# Patient Record
Sex: FEMALE | Race: WHITE | Employment: FULL TIME | URBAN - METROPOLITAN AREA
[De-identification: names, ages, dates, MRNs, and addresses within clinical notes are randomized per-mention and may not be internally consistent; named-entity substitution may affect disease eponyms.]

---

## 2017-05-01 ENCOUNTER — OFFICE VISIT (OUTPATIENT)
Dept: OBGYN CLINIC | Facility: CLINIC | Age: 42
End: 2017-05-01

## 2017-05-01 VITALS
SYSTOLIC BLOOD PRESSURE: 118 MMHG | HEIGHT: 64 IN | BODY MASS INDEX: 36.84 KG/M2 | WEIGHT: 215.81 LBS | HEART RATE: 108 BPM | DIASTOLIC BLOOD PRESSURE: 82 MMHG

## 2017-05-01 DIAGNOSIS — N92.6 MISSED MENSES: Primary | ICD-10-CM

## 2017-05-01 DIAGNOSIS — O09.522 AMA (ADVANCED MATERNAL AGE) MULTIGRAVIDA 35+, SECOND TRIMESTER: ICD-10-CM

## 2017-05-01 DIAGNOSIS — Z34.82 ENCOUNTER FOR SUPERVISION OF OTHER NORMAL PREGNANCY IN SECOND TRIMESTER: ICD-10-CM

## 2017-05-01 DIAGNOSIS — O09.32 LATE PRENATAL CARE COMPLICATING PREGNANCY, SECOND TRIMESTER: ICD-10-CM

## 2017-05-01 PROBLEM — O09.529 AMA (ADVANCED MATERNAL AGE) MULTIGRAVIDA 35+: Status: ACTIVE | Noted: 2017-05-01

## 2017-05-01 PROCEDURE — 99202 OFFICE O/P NEW SF 15 MIN: CPT | Performed by: ADVANCED PRACTICE MIDWIFE

## 2017-05-01 PROCEDURE — 81025 URINE PREGNANCY TEST: CPT | Performed by: ADVANCED PRACTICE MIDWIFE

## 2017-05-01 RX ORDER — CHOLECALCIFEROL (VITAMIN D3) 25 MCG
1 TABLET,CHEWABLE ORAL DAILY
COMMUNITY

## 2017-05-01 NOTE — PROGRESS NOTES
S.   LMP on or about 10/31/17.  35 y/o . Was not trying to get pregnant. Just happened. Family is happy. Works as a . Has 3 cats with  changing litter. Feeling fetal movements since beginning or April.   Has had no illn

## 2017-05-03 ENCOUNTER — LAB ENCOUNTER (OUTPATIENT)
Dept: LAB | Facility: HOSPITAL | Age: 42
End: 2017-05-03
Attending: ADVANCED PRACTICE MIDWIFE
Payer: COMMERCIAL

## 2017-05-03 DIAGNOSIS — Z34.82 ENCOUNTER FOR SUPERVISION OF OTHER NORMAL PREGNANCY IN SECOND TRIMESTER: ICD-10-CM

## 2017-05-03 LAB
ANTIBODY SCREEN OB: NEGATIVE
ANTIBODY SCREEN OB: NEGATIVE

## 2017-05-03 PROCEDURE — 36415 COLL VENOUS BLD VENIPUNCTURE: CPT

## 2017-05-03 PROCEDURE — 85025 COMPLETE CBC W/AUTO DIFF WBC: CPT

## 2017-05-03 PROCEDURE — 86778 TOXOPLASMA ANTIBODY IGM: CPT

## 2017-05-03 PROCEDURE — 87389 HIV-1 AG W/HIV-1&-2 AB AG IA: CPT

## 2017-05-03 PROCEDURE — 86777 TOXOPLASMA ANTIBODY: CPT

## 2017-05-03 PROCEDURE — 86901 BLOOD TYPING SEROLOGIC RH(D): CPT

## 2017-05-03 PROCEDURE — 87340 HEPATITIS B SURFACE AG IA: CPT

## 2017-05-03 PROCEDURE — 86762 RUBELLA ANTIBODY: CPT

## 2017-05-03 PROCEDURE — 86850 RBC ANTIBODY SCREEN: CPT

## 2017-05-03 PROCEDURE — 87086 URINE CULTURE/COLONY COUNT: CPT

## 2017-05-03 PROCEDURE — 86803 HEPATITIS C AB TEST: CPT

## 2017-05-03 PROCEDURE — 84443 ASSAY THYROID STIM HORMONE: CPT

## 2017-05-03 PROCEDURE — 86900 BLOOD TYPING SEROLOGIC ABO: CPT

## 2017-05-03 PROCEDURE — 82950 GLUCOSE TEST: CPT

## 2017-05-03 PROCEDURE — 86780 TREPONEMA PALLIDUM: CPT

## 2017-05-04 ENCOUNTER — NURSE ONLY (OUTPATIENT)
Dept: OBGYN CLINIC | Facility: CLINIC | Age: 42
End: 2017-05-04

## 2017-05-04 DIAGNOSIS — Z34.82 OTHER NORMAL PREGNANCY, NOT FIRST, SECOND TRIMESTER: Primary | ICD-10-CM

## 2017-05-04 NOTE — PROGRESS NOTES
Pt here today for [facility]   Education visit, Educational material reviewed. Pt verbalized understanding. pn labs already completed. Consents to blood transfusion. Undecided on panorama, gave pt brochure.

## 2017-05-05 ENCOUNTER — TELEPHONE (OUTPATIENT)
Dept: OBGYN CLINIC | Facility: CLINIC | Age: 42
End: 2017-05-05

## 2017-05-05 RX ORDER — NITROFURANTOIN 25; 75 MG/1; MG/1
100 CAPSULE ORAL 2 TIMES DAILY
Qty: 10 CAPSULE | Refills: 0 | Status: SHIPPED | OUTPATIENT
Start: 2017-05-05 | End: 2017-05-10

## 2017-05-05 NOTE — TELEPHONE ENCOUNTER
----- Message from Brian Martinez MD sent at 5/4/2017 11:41 AM CDT -----  + Urine Culture. Send in Macrobid 100 mg BID x 5 days. Call patient.

## 2017-05-05 NOTE — TELEPHONE ENCOUNTER
Spoke to patient, informed patient per MLM urine culture positive, macrobid was sent in to her pharmacy patient agrees with plan patient verbally understands

## 2017-05-05 NOTE — TELEPHONE ENCOUNTER
LMTCB. Need to inform pt of positive urine culture for staph. Macrobid ordered and sent to pt's pharmacy.

## 2017-05-18 ENCOUNTER — INITIAL PRENATAL (OUTPATIENT)
Dept: OBGYN CLINIC | Facility: CLINIC | Age: 42
End: 2017-05-18

## 2017-05-18 VITALS
WEIGHT: 217 LBS | SYSTOLIC BLOOD PRESSURE: 122 MMHG | BODY MASS INDEX: 37 KG/M2 | DIASTOLIC BLOOD PRESSURE: 73 MMHG | HEART RATE: 77 BPM

## 2017-05-18 DIAGNOSIS — Z34.83 OTHER NORMAL PREGNANCY, NOT FIRST, THIRD TRIMESTER: Primary | ICD-10-CM

## 2017-05-18 PROBLEM — O09.299 HX OF MACROSOMIA IN INFANT IN PRIOR PREGNANCY, CURRENTLY PREGNANT: Status: ACTIVE | Noted: 2017-05-18

## 2017-05-18 NOTE — PROGRESS NOTES
Pt with hx of term delivery with vacuum assist, 9 lb baby, no shoulder dystocia per pt. Preg counseling done. Has MFM level 2 u/s tomorrow.

## 2017-05-19 ENCOUNTER — HOSPITAL ENCOUNTER (OUTPATIENT)
Dept: PERINATAL CARE | Facility: HOSPITAL | Age: 42
Discharge: HOME | End: 2017-05-19
Attending: OBSTETRICS & GYNECOLOGY
Payer: COMMERCIAL

## 2017-05-19 ENCOUNTER — TELEPHONE (OUTPATIENT)
Dept: OBGYN CLINIC | Facility: CLINIC | Age: 42
End: 2017-05-19

## 2017-05-19 VITALS — SYSTOLIC BLOOD PRESSURE: 113 MMHG | DIASTOLIC BLOOD PRESSURE: 54 MMHG | HEART RATE: 76 BPM

## 2017-05-19 DIAGNOSIS — O09.293 HX OF MACROSOMIA IN INFANT IN PRIOR PREGNANCY, CURRENTLY PREGNANT, THIRD TRIMESTER: ICD-10-CM

## 2017-05-19 DIAGNOSIS — R73.09 ABNORMAL GTT (GLUCOSE TOLERANCE TEST): Primary | ICD-10-CM

## 2017-05-19 DIAGNOSIS — O09.523 AMA (ADVANCED MATERNAL AGE) MULTIGRAVIDA 35+, THIRD TRIMESTER: ICD-10-CM

## 2017-05-19 DIAGNOSIS — O09.523 AMA (ADVANCED MATERNAL AGE) MULTIGRAVIDA 35+, THIRD TRIMESTER: Primary | ICD-10-CM

## 2017-05-19 PROCEDURE — 76811 OB US DETAILED SNGL FETUS: CPT | Performed by: OBSTETRICS & GYNECOLOGY

## 2017-05-19 PROCEDURE — 99243 OFF/OP CNSLTJ NEW/EST LOW 30: CPT | Performed by: OBSTETRICS & GYNECOLOGY

## 2017-05-19 NOTE — PROGRESS NOTES
Saranya Rico is a 36year old female. Reason for Consult:   AMA. Late prenatal care. Has not had an ultrasound. Thinks she got pregnant in October. Denies any problems. First baby was over 9 lbs, had shoulder dystocia.   She stated her diabetes te options, and wider opportunities for further education and career advancement. Studies have generally shown good pregnancy outcomes in women of advanced reproductive age.  However, these women are at increased risk of infertility and pregnancy complicati obstetric population is 3 to 4 percent; this increases to 5 to 10 percent in women over age 36 and is as high as 28 percent in women over age 48.    The incidence of gestational diabetes in the general obstetric population is 3 percent, rising to 7 to 12 pe whether the association between obesity and adverse pregnancy outcome is due to factors such as diabetes mellitus.    Data suggest that obese women should be encouraged to undertake a weight reduction program (diet, exercise, behavior modification, and poss prevention or management of medical and obstetrical complications. Both prepregnancy obesity and excessive maternal weight gain before or during pregnancy contribute to an increased probability of  delivery.   It has also been hypothes pregnancy   The fetal measurements are consistent with established EDC within 2 weeks giving US Dixiese 39 7-24-17. No gross ultrasound evidence of structural abnormalities are seen today.  The patient understands that ultrasound cannot rule out all structural a allowing me to participate in the care of your patient. Please do not hesitate to call with any questions or concerns. Total patient time was 45 minutes in evaluation, consultation, and coordination of care.   Greater than 50% of this time was spent in

## 2017-05-19 NOTE — TELEPHONE ENCOUNTER
Notes Recorded by Junior Jason RN on 5/5/2017 at 9:39 AM  [phone] (home)   TCB. Phone encounter created  Notes Recorded by Kira Bowie MD on 5/4/2017 at 11:41 AM  + Urine Culture.  Send in Macrobid 100 mg BID x 5 days.  Call patient.   Debara Bosworth

## 2017-05-24 ENCOUNTER — LAB ENCOUNTER (OUTPATIENT)
Dept: LAB | Facility: HOSPITAL | Age: 42
End: 2017-05-24
Attending: ADVANCED PRACTICE MIDWIFE
Payer: COMMERCIAL

## 2017-05-24 DIAGNOSIS — R73.09 ABNORMAL GTT (GLUCOSE TOLERANCE TEST): ICD-10-CM

## 2017-05-24 PROCEDURE — 82951 GLUCOSE TOLERANCE TEST (GTT): CPT

## 2017-05-24 PROCEDURE — 36415 COLL VENOUS BLD VENIPUNCTURE: CPT

## 2017-05-24 PROCEDURE — 82952 GTT-ADDED SAMPLES: CPT

## 2017-05-31 ENCOUNTER — HOSPITAL ENCOUNTER (OUTPATIENT)
Dept: ENDOCRINOLOGY | Facility: HOSPITAL | Age: 42
Discharge: HOME | End: 2017-05-31
Attending: ADVANCED PRACTICE MIDWIFE
Payer: COMMERCIAL

## 2017-05-31 VITALS — HEIGHT: 64 IN | WEIGHT: 220.69 LBS | BODY MASS INDEX: 37.68 KG/M2

## 2017-05-31 DIAGNOSIS — O24.410 DIET CONTROLLED GESTATIONAL DIABETES MELLITUS (GDM) IN THIRD TRIMESTER: Primary | ICD-10-CM

## 2017-05-31 PROBLEM — O24.419 GESTATIONAL DIABETES MELLITUS (GDM) IN THIRD TRIMESTER: Status: ACTIVE | Noted: 2017-05-31

## 2017-06-01 ENCOUNTER — ROUTINE PRENATAL (OUTPATIENT)
Dept: OBGYN CLINIC | Facility: CLINIC | Age: 42
End: 2017-06-01

## 2017-06-01 VITALS
SYSTOLIC BLOOD PRESSURE: 113 MMHG | BODY MASS INDEX: 38 KG/M2 | DIASTOLIC BLOOD PRESSURE: 74 MMHG | HEART RATE: 79 BPM | WEIGHT: 219 LBS

## 2017-06-01 DIAGNOSIS — Z34.83 OTHER NORMAL PREGNANCY, NOT FIRST, THIRD TRIMESTER: Primary | ICD-10-CM

## 2017-06-01 NOTE — PROGRESS NOTES
Misti Santamaria  : 3/9/1975 was seen for Gestational Diabetes Counseling: Individual/Group    Date: 2017   Start time:  End time:     Obtained usual diet history    Education:     GDM Overview:  Reviewed gestational diabetes as diagnosis incl understanding and has no further questions at this time.     Carlos Brown, RN

## 2017-06-12 ENCOUNTER — HOSPITAL ENCOUNTER (OUTPATIENT)
Dept: ENDOCRINOLOGY | Facility: HOSPITAL | Age: 42
Discharge: HOME | End: 2017-06-12
Attending: ADVANCED PRACTICE MIDWIFE
Payer: COMMERCIAL

## 2017-06-12 DIAGNOSIS — O24.410 DIET CONTROLLED GESTATIONAL DIABETES MELLITUS (GDM) IN THIRD TRIMESTER: Primary | ICD-10-CM

## 2017-06-12 NOTE — PROGRESS NOTES
Sindhu Hammonds  : 3/9/1975 was seen for GDM Follow-Up Counseling    Date: 2017   Start time: 0800  End time: 0840    Weight: 219.3#    Blood Glucose:     FB-100. Patient wants to discuss values with you at upcoming appointment.      PP: B: 73-11 questions at this time.     Verl Flies

## 2017-06-15 ENCOUNTER — ROUTINE PRENATAL (OUTPATIENT)
Dept: OBGYN CLINIC | Facility: CLINIC | Age: 42
End: 2017-06-15

## 2017-06-15 ENCOUNTER — TELEPHONE (OUTPATIENT)
Dept: OBGYN CLINIC | Facility: CLINIC | Age: 42
End: 2017-06-15

## 2017-06-15 VITALS
BODY MASS INDEX: 38 KG/M2 | SYSTOLIC BLOOD PRESSURE: 127 MMHG | DIASTOLIC BLOOD PRESSURE: 79 MMHG | WEIGHT: 219.38 LBS | HEART RATE: 109 BPM

## 2017-06-15 DIAGNOSIS — Z34.83 OTHER NORMAL PREGNANCY, NOT FIRST, THIRD TRIMESTER: Primary | ICD-10-CM

## 2017-06-15 NOTE — PROGRESS NOTES
Good fetal movements. No complaints. Blood sugars show consistently elevated fasting levels ranging from . Recommend starting NPH insulin at bedtime 3 units. Will send to the diabetes center for instruction to start as soon as possible.   To notif

## 2017-06-16 ENCOUNTER — APPOINTMENT (OUTPATIENT)
Dept: OBGYN CLINIC | Facility: HOSPITAL | Age: 42
End: 2017-06-16
Attending: OBSTETRICS & GYNECOLOGY
Payer: COMMERCIAL

## 2017-06-16 ENCOUNTER — HOSPITAL ENCOUNTER (OUTPATIENT)
Facility: HOSPITAL | Age: 42
Discharge: HOME | End: 2017-06-16
Attending: OBSTETRICS & GYNECOLOGY | Admitting: OBSTETRICS & GYNECOLOGY
Payer: COMMERCIAL

## 2017-06-16 VITALS
DIASTOLIC BLOOD PRESSURE: 72 MMHG | RESPIRATION RATE: 16 BRPM | HEART RATE: 93 BPM | SYSTOLIC BLOOD PRESSURE: 133 MMHG | TEMPERATURE: 98 F

## 2017-06-16 PROCEDURE — 59025 FETAL NON-STRESS TEST: CPT | Performed by: OBSTETRICS & GYNECOLOGY

## 2017-06-16 NOTE — NST
Nonstress Test   Patient: Kelley Pyle    Gestation: 32w4d    NST: A2GDM, AMA       Variability: Moderate           Accelerations: Yes           Decelerations: None            Baseline: 125 BPM           Uterine Irritability: Yes           Contractions: I

## 2017-06-17 ENCOUNTER — HOSPITAL ENCOUNTER (OUTPATIENT)
Dept: ENDOCRINOLOGY | Facility: HOSPITAL | Age: 42
Discharge: HOME | End: 2017-06-17
Attending: ADVANCED PRACTICE MIDWIFE
Payer: COMMERCIAL

## 2017-06-17 DIAGNOSIS — O24.410 DIET CONTROLLED GESTATIONAL DIABETES MELLITUS (GDM) IN THIRD TRIMESTER: Primary | ICD-10-CM

## 2017-06-17 NOTE — PROGRESS NOTES
Jerilyn Graves  : 3/9/1975 was seen for Injection Instruction:    Date: 2017 Start time: 1230  End time: 1300      Medication type, dose and frequency: Order to start NPH insulin 3 units at bedtime.      Contacted patients preferred pharmacy to [facility]

## 2017-06-20 ENCOUNTER — HOSPITAL ENCOUNTER (OUTPATIENT)
Dept: PERINATAL CARE | Facility: HOSPITAL | Age: 42
Discharge: HOME | End: 2017-06-20
Attending: OBSTETRICS & GYNECOLOGY
Payer: COMMERCIAL

## 2017-06-20 VITALS — HEART RATE: 72 BPM | SYSTOLIC BLOOD PRESSURE: 114 MMHG | DIASTOLIC BLOOD PRESSURE: 66 MMHG

## 2017-06-20 DIAGNOSIS — O09.293 HX OF MACROSOMIA IN INFANT IN PRIOR PREGNANCY, CURRENTLY PREGNANT, THIRD TRIMESTER: ICD-10-CM

## 2017-06-20 DIAGNOSIS — O24.410 DIET CONTROLLED GESTATIONAL DIABETES MELLITUS (GDM) IN THIRD TRIMESTER: ICD-10-CM

## 2017-06-20 DIAGNOSIS — O24.414 INSULIN CONTROLLED GESTATIONAL DIABETES MELLITUS (GDM) IN THIRD TRIMESTER: ICD-10-CM

## 2017-06-20 DIAGNOSIS — O09.523 AMA (ADVANCED MATERNAL AGE) MULTIGRAVIDA 35+, THIRD TRIMESTER: ICD-10-CM

## 2017-06-20 DIAGNOSIS — O09.523 AMA (ADVANCED MATERNAL AGE) MULTIGRAVIDA 35+, THIRD TRIMESTER: Primary | ICD-10-CM

## 2017-06-20 DIAGNOSIS — O09.32 LATE PRENATAL CARE IN SECOND TRIMESTER: ICD-10-CM

## 2017-06-20 PROCEDURE — 76805 OB US >/= 14 WKS SNGL FETUS: CPT | Performed by: OBSTETRICS & GYNECOLOGY

## 2017-06-20 PROCEDURE — 99215 OFFICE O/P EST HI 40 MIN: CPT | Performed by: OBSTETRICS & GYNECOLOGY

## 2017-06-20 PROCEDURE — 76819 FETAL BIOPHYS PROFIL W/O NST: CPT | Performed by: OBSTETRICS & GYNECOLOGY

## 2017-06-20 NOTE — PROGRESS NOTES
MFM follow up outpatient consultation -  AMA; polyhydramnios, Gestational diabetes A2    S: Good FM. Doing well on the GDM diet. No complaints    Her history was reviewed form her visit on 5/19/17 and there were no interval changes.     O:  /66 mmHg is related to polycystic ovary syndrome (PCOS). It is also important to note that even among ovulatory women, increasing obesity is associated with decreasing spontaneous pregnancy rates.   The increased risk of miscarriage in obese women may be because suc emergency delivery, prolonged incision to delivery interval, blood loss >1000 mL, longer operative times, wound infection, thromboembolism, and endometritis.             Maternal obesity appears to be associated with a small increase in the absolute rate of DIABETES - follow-up  ADA diet recommendations were reviewed and the patient's dietary compliance is good.   Her capillary blood glucose records were reviewed today; her compliance with the recommended four times daily assessments (fasting and two-hour post 1.   IUP @  33w1d    2. Scan consistent with dates; BPP is 8/8  3. No fetal structural abnormalities seen but limited   4. AMA -  She declined invasive genetic testing  5. Obesity BMI 37  6.  GDM A2 - mildly elevated FBS  7. polyhydramnios      RECOMMENDATI

## 2017-06-20 NOTE — PROGRESS NOTES
Pt for growth u/s  Recently started NPH insulin a HS 3 units 6/19/17  Hx AMA and high BMI  Denies pregnancy complaints  States active fetus  Pt will email BS log Mondays

## 2017-06-21 ENCOUNTER — HOSPITAL ENCOUNTER (OUTPATIENT)
Facility: HOSPITAL | Age: 42
Discharge: HOME | End: 2017-06-21
Attending: OBSTETRICS & GYNECOLOGY | Admitting: OBSTETRICS & GYNECOLOGY
Payer: COMMERCIAL

## 2017-06-21 ENCOUNTER — TELEPHONE (OUTPATIENT)
Dept: PERINATAL CARE | Facility: HOSPITAL | Age: 42
End: 2017-06-21

## 2017-06-21 ENCOUNTER — APPOINTMENT (OUTPATIENT)
Dept: OBGYN CLINIC | Facility: HOSPITAL | Age: 42
End: 2017-06-21
Payer: COMMERCIAL

## 2017-06-21 VITALS — RESPIRATION RATE: 16 BRPM | SYSTOLIC BLOOD PRESSURE: 108 MMHG | DIASTOLIC BLOOD PRESSURE: 45 MMHG | HEART RATE: 74 BPM

## 2017-06-21 PROCEDURE — 59025 FETAL NON-STRESS TEST: CPT | Performed by: OBSTETRICS & GYNECOLOGY

## 2017-06-21 NOTE — ADDENDUM NOTE
Encounter addended by: Darrell Sunshine on: 6/21/2017 12:31 PM<BR>     Documentation filed: Charges VN

## 2017-06-21 NOTE — NST
Nonstress Test   Patient: Mohit Parrish    Gestation: 33w2d    NST: SCHEDULED NST FOR A2GDM AND AMA       Variability: Moderate           Accelerations: Yes           Decelerations: None            Baseline: 145 BPM           Uterine Irritability: Yes

## 2017-06-23 ENCOUNTER — TELEPHONE (OUTPATIENT)
Dept: OBGYN CLINIC | Facility: CLINIC | Age: 42
End: 2017-06-23

## 2017-06-23 NOTE — TELEPHONE ENCOUNTER
Please inform blood sugars I reviewed from yesterday look real good. Continue her current insulin dosing and glucose checking. Send her values in to [facility] Dr Hernandez Mandujano weekly.

## 2017-06-26 ENCOUNTER — TELEPHONE (OUTPATIENT)
Dept: PERINATAL CARE | Facility: HOSPITAL | Age: 42
End: 2017-06-26

## 2017-06-26 NOTE — TELEPHONE ENCOUNTER
Blood glucose log from 6/20/17 through 6/22/17 reviewed by Dr. Luke Flash  No change with current regime.   lvm for pt to continue current 6uNPH at hs

## 2017-06-27 ENCOUNTER — TELEPHONE (OUTPATIENT)
Dept: PERINATAL CARE | Facility: HOSPITAL | Age: 42
End: 2017-06-27

## 2017-06-28 ENCOUNTER — HOSPITAL ENCOUNTER (OUTPATIENT)
Dept: OBGYN CLINIC | Facility: HOSPITAL | Age: 42
Discharge: HOME | End: 2017-06-28
Payer: COMMERCIAL

## 2017-06-28 ENCOUNTER — HOSPITAL ENCOUNTER (OUTPATIENT)
Facility: HOSPITAL | Age: 42
Discharge: HOME | End: 2017-06-28
Attending: OBSTETRICS & GYNECOLOGY | Admitting: OBSTETRICS & GYNECOLOGY
Payer: COMMERCIAL

## 2017-06-28 VITALS
SYSTOLIC BLOOD PRESSURE: 116 MMHG | DIASTOLIC BLOOD PRESSURE: 58 MMHG | RESPIRATION RATE: 17 BRPM | WEIGHT: 220 LBS | TEMPERATURE: 98 F | BODY MASS INDEX: 37.56 KG/M2 | HEIGHT: 64 IN | HEART RATE: 69 BPM

## 2017-06-28 PROBLEM — O24.419 WHITE CLASSIFICATION A2 GESTATIONAL DIABETES MELLITUS: Status: ACTIVE | Noted: 2017-05-31

## 2017-06-28 PROCEDURE — 59025 FETAL NON-STRESS TEST: CPT | Performed by: OBSTETRICS & GYNECOLOGY

## 2017-06-28 NOTE — NST
Nonstress Test   Patient: Ed Lawrence    Gestation: 34w2d    NST:       Variability: Moderate           Accelerations: Yes           Decelerations: None            Baseline: 125 BPM           Uterine Irritability: No           Contractions: Irregular

## 2017-06-29 ENCOUNTER — ROUTINE PRENATAL (OUTPATIENT)
Dept: OBGYN CLINIC | Facility: CLINIC | Age: 42
End: 2017-06-29

## 2017-06-29 VITALS
SYSTOLIC BLOOD PRESSURE: 107 MMHG | BODY MASS INDEX: 39 KG/M2 | HEART RATE: 87 BPM | DIASTOLIC BLOOD PRESSURE: 71 MMHG | WEIGHT: 225 LBS

## 2017-06-29 DIAGNOSIS — O09.529 SUPERVISION OF ELDERLY MULTIGRAVIDA, UNSPECIFIED TRIMESTER: Primary | ICD-10-CM

## 2017-06-29 DIAGNOSIS — O24.414 INSULIN CONTROLLED GESTATIONAL DIABETES MELLITUS (GDM) DURING PREGNANCY, ANTEPARTUM: ICD-10-CM

## 2017-06-29 PROCEDURE — 81002 URINALYSIS NONAUTO W/O SCOPE: CPT | Performed by: OBSTETRICS & GYNECOLOGY

## 2017-06-29 RX ORDER — CALCIUM CARB/VITAMIN D3/VIT K1 500-100-40
TABLET,CHEWABLE ORAL
Refills: 2 | COMMUNITY
Start: 2017-06-17

## 2017-06-29 NOTE — PROGRESS NOTES
[facility], [facility]  Obstetrics and Gynecology  Prenatal Visit  Yossi Kovacs MD    HPI   Niki Raines is a 36year old.o.  34w3d weeks. Here for routine prenatal visit and is without complaints.   Patient denies any regular uterine contractions, spo 10/31/2016 (Within Weeks)   BMI 38.62 kg/m²     Assessment   Brayan is a 36year old female  with viable IUP at 34w3d weeks.   Advanced maternal age pregnancy with gestational diabetes A2  Patient has been having normal  testing and normal lucas

## 2017-07-05 ENCOUNTER — HOSPITAL ENCOUNTER (OUTPATIENT)
Facility: HOSPITAL | Age: 42
Discharge: HOME | End: 2017-07-05
Attending: OBSTETRICS & GYNECOLOGY | Admitting: OBSTETRICS & GYNECOLOGY
Payer: COMMERCIAL

## 2017-07-05 PROCEDURE — 59025 FETAL NON-STRESS TEST: CPT | Performed by: OBSTETRICS & GYNECOLOGY

## 2017-07-05 NOTE — NST
Nonstress Test   Patient: Brayan Gunn    Gestation: 35w2d    NST:  A2gdm, ama high bmi     Variability: Moderate           Accelerations: Yes           Decelerations: None            Baseline: 135 BPM           Uterine Irritability: No           Contract

## 2017-07-06 ENCOUNTER — ROUTINE PRENATAL (OUTPATIENT)
Dept: OBGYN CLINIC | Facility: CLINIC | Age: 42
End: 2017-07-06

## 2017-07-06 VITALS
WEIGHT: 222.81 LBS | DIASTOLIC BLOOD PRESSURE: 74 MMHG | SYSTOLIC BLOOD PRESSURE: 110 MMHG | HEART RATE: 96 BPM | BODY MASS INDEX: 38 KG/M2

## 2017-07-06 DIAGNOSIS — Z23 NEED FOR VACCINATION: ICD-10-CM

## 2017-07-06 DIAGNOSIS — Z34.83 OTHER NORMAL PREGNANCY, NOT FIRST, THIRD TRIMESTER: Primary | ICD-10-CM

## 2017-07-06 PROBLEM — O40.9XX0 POLYHYDRAMNIOS, ANTEPARTUM COMPLICATION: Status: ACTIVE | Noted: 2017-07-06

## 2017-07-06 LAB
APPEARANCE: CLEAR
MULTISTIX LOT#: NORMAL NUMERIC
PH, URINE: 6.5 (ref 4.5–8)
SPECIFIC GRAVITY: 1.02 (ref 1–1.03)
URINE-COLOR: YELLOW
UROBILINOGEN,SEMI-QN: 0.2 MG/DL (ref 0–1.9)

## 2017-07-06 PROCEDURE — 90471 IMMUNIZATION ADMIN: CPT | Performed by: ADVANCED PRACTICE MIDWIFE

## 2017-07-06 PROCEDURE — 90715 TDAP VACCINE 7 YRS/> IM: CPT | Performed by: ADVANCED PRACTICE MIDWIFE

## 2017-07-06 NOTE — PROGRESS NOTES
S.  Denies JUNE, vision change, URQ pain. Has some mild swelling in right leg that decreases with elevation. Testing blood sugar. Is taking insulin at night so is having readings 90 or below in am.  2 hour PP are normal under 120. Baby is active.   Denies

## 2017-07-12 ENCOUNTER — HOSPITAL ENCOUNTER (OUTPATIENT)
Facility: HOSPITAL | Age: 42
Discharge: HOME | End: 2017-07-12
Attending: OBSTETRICS & GYNECOLOGY | Admitting: OBSTETRICS & GYNECOLOGY
Payer: COMMERCIAL

## 2017-07-12 ENCOUNTER — HOSPITAL ENCOUNTER (OUTPATIENT)
Dept: PERINATAL CARE | Facility: HOSPITAL | Age: 42
Discharge: HOME | End: 2017-07-12
Attending: OBSTETRICS & GYNECOLOGY
Payer: COMMERCIAL

## 2017-07-12 ENCOUNTER — APPOINTMENT (OUTPATIENT)
Dept: OBGYN CLINIC | Facility: HOSPITAL | Age: 42
End: 2017-07-12
Payer: COMMERCIAL

## 2017-07-12 VITALS — DIASTOLIC BLOOD PRESSURE: 65 MMHG | HEART RATE: 73 BPM | RESPIRATION RATE: 18 BRPM | SYSTOLIC BLOOD PRESSURE: 127 MMHG

## 2017-07-12 VITALS — DIASTOLIC BLOOD PRESSURE: 64 MMHG | SYSTOLIC BLOOD PRESSURE: 102 MMHG | HEART RATE: 75 BPM

## 2017-07-12 DIAGNOSIS — O40.9XX0 POLYHYDRAMNIOS, ANTEPARTUM COMPLICATION: ICD-10-CM

## 2017-07-12 DIAGNOSIS — O09.32 LATE PRENATAL CARE IN SECOND TRIMESTER: ICD-10-CM

## 2017-07-12 DIAGNOSIS — O40.3XX0 POLYHYDRAMNIOS, ANTEPARTUM COMPLICATION, THIRD TRIMESTER, NOT APPLICABLE OR UNSPECIFIED FETUS: ICD-10-CM

## 2017-07-12 DIAGNOSIS — O09.523 AMA (ADVANCED MATERNAL AGE) MULTIGRAVIDA 35+, THIRD TRIMESTER: ICD-10-CM

## 2017-07-12 DIAGNOSIS — O24.419 WHITE CLASSIFICATION A2 GESTATIONAL DIABETES MELLITUS: ICD-10-CM

## 2017-07-12 DIAGNOSIS — O09.523 AMA (ADVANCED MATERNAL AGE) MULTIGRAVIDA 35+, THIRD TRIMESTER: Primary | ICD-10-CM

## 2017-07-12 PROCEDURE — 76819 FETAL BIOPHYS PROFIL W/O NST: CPT | Performed by: OBSTETRICS & GYNECOLOGY

## 2017-07-12 PROCEDURE — 76805 OB US >/= 14 WKS SNGL FETUS: CPT | Performed by: OBSTETRICS & GYNECOLOGY

## 2017-07-12 PROCEDURE — 59025 FETAL NON-STRESS TEST: CPT | Performed by: OBSTETRICS & GYNECOLOGY

## 2017-07-12 PROCEDURE — 99214 OFFICE O/P EST MOD 30 MIN: CPT | Performed by: OBSTETRICS & GYNECOLOGY

## 2017-07-12 NOTE — PROGRESS NOTES
MFM follow up outpatient consultation -  AMA; polyhydramnios, Gestational diabetes A2     S: Good FM. Doing well on the GDM diet.   No complaints.     Her history was reviewed form her visit on 5/19/17 and there were no interval changes.     O:  /64 obese  is associated with numerous perioperative concerns, including emergency delivery, prolonged incision to delivery interval, blood loss >1000 mL, longer operative times, wound infection, thromboembolism, and endometritis.             Maternal ob Posterior, grade 3      Fetal Anatomy:  Visualized with normal appearance: head, face, spine, brain, chest,  gastrointestinal tract, kidneys, bladder.     Summary of Ultrasound findings: This is a [place] pregnancy; cephalic.   EFW 8 lbs 13 oz (4009 gram allowing me to participate in the care of your patient.  Please do not hesitate to call with any questions or concerns.      Total patient time was 25 minutes in evaluation, consultation, and coordination of care.  Greater than 50% of this time was spent i

## 2017-07-12 NOTE — PROGRESS NOTES
Pt for growth U/S  Hx AMA and a2gdm   6 units nph at hs since 6/20/17  Denies pregnancy complaints  States active fetus

## 2017-07-12 NOTE — NST
Nonstress Test   Patient: Leslie Willard    Gestation: 36w2d    NST: SCHEDULED NST FOR AMA AND A2GDM       Variability: Moderate           Accelerations: Yes           Decelerations: Variable            Baseline: 125 BPM           Uterine Irritability: Yes

## 2017-07-12 NOTE — ADDENDUM NOTE
Encounter addended by: Juan Paulson on: 7/12/2017  5:31 PM<BR>    Actions taken: Charge Capture section accepted

## 2017-07-17 ENCOUNTER — HOSPITAL ENCOUNTER (OUTPATIENT)
Facility: HOSPITAL | Age: 42
Discharge: HOME | End: 2017-07-17
Attending: OBSTETRICS & GYNECOLOGY | Admitting: OBSTETRICS & GYNECOLOGY
Payer: COMMERCIAL

## 2017-07-17 ENCOUNTER — HOSPITAL ENCOUNTER (OUTPATIENT)
Dept: OBGYN CLINIC | Facility: HOSPITAL | Age: 42
Discharge: HOME | End: 2017-07-17
Payer: COMMERCIAL

## 2017-07-17 ENCOUNTER — ROUTINE PRENATAL (OUTPATIENT)
Dept: OBGYN CLINIC | Facility: CLINIC | Age: 42
End: 2017-07-17

## 2017-07-17 VITALS
BODY MASS INDEX: 38 KG/M2 | HEART RATE: 93 BPM | WEIGHT: 224.19 LBS | SYSTOLIC BLOOD PRESSURE: 119 MMHG | DIASTOLIC BLOOD PRESSURE: 76 MMHG

## 2017-07-17 DIAGNOSIS — Z34.83 ENCOUNTER FOR SUPERVISION OF OTHER NORMAL PREGNANCY IN THIRD TRIMESTER: Primary | ICD-10-CM

## 2017-07-17 LAB
APPEARANCE: CLEAR
MULTISTIX LOT#: NORMAL NUMERIC
PH, URINE: 6 (ref 4.5–8)
SPECIFIC GRAVITY: 1.01 (ref 1–1.03)
URINE-COLOR: YELLOW
UROBILINOGEN,SEMI-QN: 0.2 MG/DL (ref 0–1.9)

## 2017-07-17 PROCEDURE — 59025 FETAL NON-STRESS TEST: CPT | Performed by: OBSTETRICS & GYNECOLOGY

## 2017-07-18 NOTE — NST
Nonstress Test   Patient: Franklin Burk    Gestation: 37w0d    NST:       Variability: Moderate           Accelerations: Yes           Decelerations: None            Baseline: 120 BPM           Uterine Irritability: Yes           Contractions: Irregular

## 2017-07-19 ENCOUNTER — APPOINTMENT (OUTPATIENT)
Dept: OBGYN CLINIC | Facility: HOSPITAL | Age: 42
End: 2017-07-19
Payer: COMMERCIAL

## 2017-07-19 ENCOUNTER — HOSPITAL ENCOUNTER (OUTPATIENT)
Facility: HOSPITAL | Age: 42
Discharge: HOME | End: 2017-07-19
Attending: OBSTETRICS & GYNECOLOGY | Admitting: OBSTETRICS & GYNECOLOGY
Payer: COMMERCIAL

## 2017-07-19 VITALS — RESPIRATION RATE: 18 BRPM | DIASTOLIC BLOOD PRESSURE: 76 MMHG | HEART RATE: 81 BPM | SYSTOLIC BLOOD PRESSURE: 122 MMHG

## 2017-07-19 PROCEDURE — 59025 FETAL NON-STRESS TEST: CPT | Performed by: OBSTETRICS & GYNECOLOGY

## 2017-07-19 NOTE — NST
Nonstress Test   Patient: Uvaldo Acosta    Gestation: 37w2d    NST:high BMI, AMA, A2GDM       Variability: Moderate           Accelerations: Yes           Decelerations: None            Baseline: 135 BPM           Uterine Irritability: No           Contrac

## 2017-07-20 ENCOUNTER — HOSPITAL ENCOUNTER (OUTPATIENT)
Dept: PERINATAL CARE | Facility: HOSPITAL | Age: 42
Discharge: HOME | End: 2017-07-20
Attending: OBSTETRICS & GYNECOLOGY
Payer: COMMERCIAL

## 2017-07-20 ENCOUNTER — TELEPHONE (OUTPATIENT)
Dept: OBGYN CLINIC | Facility: CLINIC | Age: 42
End: 2017-07-20

## 2017-07-20 VITALS — DIASTOLIC BLOOD PRESSURE: 77 MMHG | SYSTOLIC BLOOD PRESSURE: 104 MMHG | HEART RATE: 101 BPM

## 2017-07-20 DIAGNOSIS — O09.523 AMA (ADVANCED MATERNAL AGE) MULTIGRAVIDA 35+, THIRD TRIMESTER: Primary | ICD-10-CM

## 2017-07-20 DIAGNOSIS — O09.523 AMA (ADVANCED MATERNAL AGE) MULTIGRAVIDA 35+, THIRD TRIMESTER: ICD-10-CM

## 2017-07-20 DIAGNOSIS — O09.32 LATE PRENATAL CARE IN SECOND TRIMESTER: ICD-10-CM

## 2017-07-20 PROCEDURE — 76819 FETAL BIOPHYS PROFIL W/O NST: CPT | Performed by: OBSTETRICS & GYNECOLOGY

## 2017-07-20 PROCEDURE — 99212 OFFICE O/P EST SF 10 MIN: CPT | Performed by: OBSTETRICS & GYNECOLOGY

## 2017-07-20 NOTE — PROGRESS NOTES
Pt here for level two ultrasound and BPP  AMA  Late onset prenatal Care High   BMI 37.64  A2GDM   Denies pregnancy complaints and states feeling fetal movement

## 2017-07-20 NOTE — PROGRESS NOTES
MFM follow up outpatient consultation -  AMA; polyhydramnios, Gestational diabetes A2     S: Good FM. Doing well on the GDM diet. No complaints.   Blood sugars have been good since starting insulin.     Her history was reviewed form her visit on 5/19/17 a

## 2017-07-20 NOTE — TELEPHONE ENCOUNTER
Please inform patient and also schedule induction of labor. On the advice of Maternal-Fetal Medicine Dr. Magdalena Castro Recommend delivery at 38 weeks due to  Class A2 gestational diabetes, advanced maternal age, and polyhydramnios.   Schedule patient to be admi

## 2017-07-21 NOTE — TELEPHONE ENCOUNTER
Spoke to pt and informed her of IOL. Informed her to get there 1 hr early. She Understood and verbalized agreement. Entered in book.

## 2017-07-23 ENCOUNTER — HOSPITAL ENCOUNTER (INPATIENT)
Dept: OBGYN CLINIC | Facility: HOSPITAL | Age: 42
Discharge: HOME | End: 2017-07-23
Attending: OBSTETRICS & GYNECOLOGY
Payer: COMMERCIAL

## 2017-07-23 ENCOUNTER — HOSPITAL ENCOUNTER (INPATIENT)
Facility: HOSPITAL | Age: 42
LOS: 4 days | Discharge: HOME | End: 2017-07-27
Attending: OBSTETRICS & GYNECOLOGY | Admitting: OBSTETRICS & GYNECOLOGY
Payer: COMMERCIAL

## 2017-07-23 PROBLEM — Z34.90 PREGNANCY: Status: ACTIVE | Noted: 2017-07-23

## 2017-07-23 LAB
ANTIBODY SCREEN: NEGATIVE
ERYTHROCYTE [DISTWIDTH] IN BLOOD BY AUTOMATED COUNT: 14.9 % (ref 11–15)
GLUCOSE BLDC GLUCOMTR-MCNC: 120 MG/DL (ref 70–99)
HCT VFR BLD AUTO: 34.3 % (ref 35–48)
HGB BLD-MCNC: 11.4 G/DL (ref 12–16)
MCH RBC QN AUTO: 27 PG (ref 27–32)
MCHC RBC AUTO-ENTMCNC: 33.3 G/DL (ref 32–37)
MCV RBC AUTO: 81.1 FL (ref 80–100)
PLATELET # BLD AUTO: 243 K/UL (ref 140–400)
PMV BLD AUTO: 9.6 FL (ref 7.4–10.3)
RBC # BLD AUTO: 4.22 M/UL (ref 3.7–5.4)
RH BLOOD TYPE: POSITIVE
WBC # BLD AUTO: 9.9 K/UL (ref 4–11)

## 2017-07-23 PROCEDURE — 3E0P7GC INTRODUCTION OF OTHER THERAPEUTIC SUBSTANCE INTO FEMALE REPRODUCTIVE, VIA NATURAL OR ARTIFICIAL OPENING: ICD-10-PCS | Performed by: OBSTETRICS & GYNECOLOGY

## 2017-07-23 PROCEDURE — 59200 INSERT CERVICAL DILATOR: CPT | Performed by: OBSTETRICS & GYNECOLOGY

## 2017-07-23 RX ORDER — AMMONIA INHALANTS 0.04 G/.3ML
0.3 INHALANT RESPIRATORY (INHALATION) AS NEEDED
Status: DISCONTINUED | OUTPATIENT
Start: 2017-07-23 | End: 2017-07-24

## 2017-07-23 RX ORDER — TERBUTALINE SULFATE 1 MG/ML
0.25 INJECTION, SOLUTION SUBCUTANEOUS AS NEEDED
Status: DISCONTINUED | OUTPATIENT
Start: 2017-07-23 | End: 2017-07-24

## 2017-07-23 RX ORDER — LIDOCAINE HYDROCHLORIDE 10 MG/ML
30 INJECTION, SOLUTION EPIDURAL; INFILTRATION; INTRACAUDAL; PERINEURAL ONCE
Status: DISCONTINUED | OUTPATIENT
Start: 2017-07-23 | End: 2017-07-24

## 2017-07-23 RX ORDER — ONDANSETRON 2 MG/ML
4 INJECTION INTRAMUSCULAR; INTRAVENOUS EVERY 6 HOURS PRN
Status: DISCONTINUED | OUTPATIENT
Start: 2017-07-23 | End: 2017-07-24

## 2017-07-23 RX ORDER — IBUPROFEN 600 MG/1
600 TABLET ORAL ONCE AS NEEDED
Status: DISCONTINUED | OUTPATIENT
Start: 2017-07-23 | End: 2017-07-24

## 2017-07-23 RX ORDER — SODIUM CHLORIDE, SODIUM LACTATE, POTASSIUM CHLORIDE, CALCIUM CHLORIDE 600; 310; 30; 20 MG/100ML; MG/100ML; MG/100ML; MG/100ML
INJECTION, SOLUTION INTRAVENOUS
Status: COMPLETED
Start: 2017-07-23 | End: 2017-07-23

## 2017-07-23 RX ORDER — DEXTROSE, SODIUM CHLORIDE, SODIUM LACTATE, POTASSIUM CHLORIDE, AND CALCIUM CHLORIDE 5; .6; .31; .03; .02 G/100ML; G/100ML; G/100ML; G/100ML; G/100ML
125 INJECTION, SOLUTION INTRAVENOUS CONTINUOUS
Status: DISCONTINUED | OUTPATIENT
Start: 2017-07-23 | End: 2017-07-24

## 2017-07-23 RX ORDER — SODIUM CHLORIDE 0.9 % (FLUSH) 0.9 %
10 SYRINGE (ML) INJECTION AS NEEDED
Status: DISCONTINUED | OUTPATIENT
Start: 2017-07-23 | End: 2017-07-24

## 2017-07-24 ENCOUNTER — ANESTHESIA (OUTPATIENT)
Dept: OBGYN UNIT | Facility: HOSPITAL | Age: 42
End: 2017-07-24
Payer: COMMERCIAL

## 2017-07-24 ENCOUNTER — APPOINTMENT (OUTPATIENT)
Dept: GENERAL RADIOLOGY | Facility: HOSPITAL | Age: 42
End: 2017-07-24
Attending: OBSTETRICS & GYNECOLOGY
Payer: COMMERCIAL

## 2017-07-24 ENCOUNTER — SURGERY (OUTPATIENT)
Age: 42
End: 2017-07-24
Payer: COMMERCIAL

## 2017-07-24 ENCOUNTER — ANESTHESIA EVENT (OUTPATIENT)
Dept: OBGYN UNIT | Facility: HOSPITAL | Age: 42
End: 2017-07-24
Payer: COMMERCIAL

## 2017-07-24 LAB
GLUCOSE BLDC GLUCOMTR-MCNC: 112 MG/DL (ref 70–99)
GLUCOSE BLDC GLUCOMTR-MCNC: 73 MG/DL (ref 70–99)
GLUCOSE BLDC GLUCOMTR-MCNC: 77 MG/DL (ref 70–99)
GLUCOSE BLDC GLUCOMTR-MCNC: 98 MG/DL (ref 70–99)
T PALLIDUM AB SER QL: NEGATIVE

## 2017-07-24 PROCEDURE — 0T9B70Z DRAINAGE OF BLADDER WITH DRAINAGE DEVICE, VIA NATURAL OR ARTIFICIAL OPENING: ICD-10-PCS | Performed by: OBSTETRICS & GYNECOLOGY

## 2017-07-24 PROCEDURE — 74000 XR ABDOMEN (1 VIEW) (CPT=74000): CPT | Performed by: OBSTETRICS & GYNECOLOGY

## 2017-07-24 PROCEDURE — 62320 NJX INTERLAMINAR CRV/THRC: CPT | Performed by: ANESTHESIOLOGY

## 2017-07-24 PROCEDURE — 59514 CESAREAN DELIVERY ONLY: CPT | Performed by: OBSTETRICS & GYNECOLOGY

## 2017-07-24 PROCEDURE — 59510 CESAREAN DELIVERY: CPT | Performed by: OBSTETRICS & GYNECOLOGY

## 2017-07-24 RX ORDER — SODIUM CHLORIDE, SODIUM LACTATE, POTASSIUM CHLORIDE, CALCIUM CHLORIDE 600; 310; 30; 20 MG/100ML; MG/100ML; MG/100ML; MG/100ML
INJECTION, SOLUTION INTRAVENOUS CONTINUOUS
Status: DISCONTINUED | OUTPATIENT
Start: 2017-07-24 | End: 2017-07-27

## 2017-07-24 RX ORDER — ONDANSETRON 2 MG/ML
4 INJECTION INTRAMUSCULAR; INTRAVENOUS EVERY 6 HOURS PRN
Status: DISCONTINUED | OUTPATIENT
Start: 2017-07-24 | End: 2017-07-24

## 2017-07-24 RX ORDER — DEXTROSE, SODIUM CHLORIDE, SODIUM LACTATE, POTASSIUM CHLORIDE, AND CALCIUM CHLORIDE 5; .6; .31; .03; .02 G/100ML; G/100ML; G/100ML; G/100ML; G/100ML
INJECTION, SOLUTION INTRAVENOUS CONTINUOUS
Status: DISCONTINUED | OUTPATIENT
Start: 2017-07-24 | End: 2017-07-27

## 2017-07-24 RX ORDER — LIDOCAINE HYDROCHLORIDE AND EPINEPHRINE 20; 5 MG/ML; UG/ML
INJECTION, SOLUTION EPIDURAL; INFILTRATION; INTRACAUDAL; PERINEURAL
Status: DISCONTINUED
Start: 2017-07-24 | End: 2017-07-24 | Stop reason: WASHOUT

## 2017-07-24 RX ORDER — DIPHENHYDRAMINE HYDROCHLORIDE 50 MG/ML
25 INJECTION INTRAMUSCULAR; INTRAVENOUS ONCE AS NEEDED
Status: ACTIVE | OUTPATIENT
Start: 2017-07-24 | End: 2017-07-24

## 2017-07-24 RX ORDER — SODIUM CHLORIDE, SODIUM LACTATE, POTASSIUM CHLORIDE, CALCIUM CHLORIDE 600; 310; 30; 20 MG/100ML; MG/100ML; MG/100ML; MG/100ML
INJECTION, SOLUTION INTRAVENOUS
Status: DISCONTINUED
Start: 2017-07-24 | End: 2017-07-24 | Stop reason: WASHOUT

## 2017-07-24 RX ORDER — BUPIVACAINE HYDROCHLORIDE 2.5 MG/ML
INJECTION, SOLUTION EPIDURAL; INFILTRATION; INTRACAUDAL AS NEEDED
Status: DISCONTINUED | OUTPATIENT
Start: 2017-07-24 | End: 2017-07-24 | Stop reason: SURG

## 2017-07-24 RX ORDER — ZOLPIDEM TARTRATE 5 MG/1
5 TABLET ORAL NIGHTLY PRN
Status: DISCONTINUED | OUTPATIENT
Start: 2017-07-24 | End: 2017-07-27

## 2017-07-24 RX ORDER — AMMONIA INHALANTS 0.04 G/.3ML
0.3 INHALANT RESPIRATORY (INHALATION) AS NEEDED
Status: DISCONTINUED | OUTPATIENT
Start: 2017-07-24 | End: 2017-07-27

## 2017-07-24 RX ORDER — NALBUPHINE HCL 10 MG/ML
2.5 AMPUL (ML) INJECTION
Status: DISCONTINUED | OUTPATIENT
Start: 2017-07-24 | End: 2017-07-27

## 2017-07-24 RX ORDER — POLYETHYLENE GLYCOL 3350 17 G/17G
17 POWDER, FOR SOLUTION ORAL DAILY PRN
Status: DISCONTINUED | OUTPATIENT
Start: 2017-07-24 | End: 2017-07-27

## 2017-07-24 RX ORDER — EPHEDRINE SULFATE/0.9% NACL/PF 25 MG/5 ML
SYRINGE (ML) INTRAVENOUS
Status: DISCONTINUED
Start: 2017-07-24 | End: 2017-07-24 | Stop reason: WASHOUT

## 2017-07-24 RX ORDER — DEXTROSE MONOHYDRATE 25 G/50ML
50 INJECTION, SOLUTION INTRAVENOUS AS NEEDED
Status: DISCONTINUED | OUTPATIENT
Start: 2017-07-24 | End: 2017-07-27

## 2017-07-24 RX ORDER — SODIUM CHLORIDE 0.9 % (FLUSH) 0.9 %
10 SYRINGE (ML) INJECTION AS NEEDED
Status: DISCONTINUED | OUTPATIENT
Start: 2017-07-24 | End: 2017-07-27

## 2017-07-24 RX ORDER — SIMETHICONE 80 MG
80 TABLET,CHEWABLE ORAL 3 TIMES DAILY PRN
Status: DISCONTINUED | OUTPATIENT
Start: 2017-07-24 | End: 2017-07-27

## 2017-07-24 RX ORDER — KETOROLAC TROMETHAMINE 30 MG/ML
30 INJECTION, SOLUTION INTRAMUSCULAR; INTRAVENOUS ONCE AS NEEDED
Status: COMPLETED | OUTPATIENT
Start: 2017-07-24 | End: 2017-07-24

## 2017-07-24 RX ORDER — DEXAMETHASONE SODIUM PHOSPHATE 4 MG/ML
VIAL (ML) INJECTION AS NEEDED
Status: DISCONTINUED | OUTPATIENT
Start: 2017-07-24 | End: 2017-07-24 | Stop reason: SURG

## 2017-07-24 RX ORDER — LIDOCAINE HYDROCHLORIDE AND EPINEPHRINE 15; 5 MG/ML; UG/ML
INJECTION, SOLUTION EPIDURAL AS NEEDED
Status: DISCONTINUED | OUTPATIENT
Start: 2017-07-24 | End: 2017-07-24 | Stop reason: SURG

## 2017-07-24 RX ORDER — HYDROMORPHONE HYDROCHLORIDE 1 MG/ML
0.4 INJECTION, SOLUTION INTRAMUSCULAR; INTRAVENOUS; SUBCUTANEOUS EVERY 30 MIN PRN
Status: DISCONTINUED | OUTPATIENT
Start: 2017-07-24 | End: 2017-07-27

## 2017-07-24 RX ORDER — DOCUSATE SODIUM 100 MG/1
100 CAPSULE, LIQUID FILLED ORAL
Status: DISCONTINUED | OUTPATIENT
Start: 2017-07-24 | End: 2017-07-25

## 2017-07-24 RX ORDER — PHENYLEPHRINE HCL IN 0.9% NACL 0.5 MG/5ML
SYRINGE (ML) INTRAVENOUS
Status: DISCONTINUED
Start: 2017-07-24 | End: 2017-07-24 | Stop reason: WASHOUT

## 2017-07-24 RX ORDER — NALBUPHINE HCL 10 MG/ML
2.5 AMPUL (ML) INJECTION EVERY 4 HOURS PRN
Status: DISCONTINUED | OUTPATIENT
Start: 2017-07-24 | End: 2017-07-27

## 2017-07-24 RX ORDER — EPHEDRINE SULFATE/0.9% NACL/PF 25 MG/5 ML
5 SYRINGE (ML) INTRAVENOUS AS NEEDED
Status: DISCONTINUED | OUTPATIENT
Start: 2017-07-24 | End: 2017-07-24

## 2017-07-24 RX ORDER — ONDANSETRON 2 MG/ML
4 INJECTION INTRAMUSCULAR; INTRAVENOUS ONCE AS NEEDED
Status: ACTIVE | OUTPATIENT
Start: 2017-07-24 | End: 2017-07-24

## 2017-07-24 RX ORDER — LIDOCAINE HYDROCHLORIDE AND EPINEPHRINE 20; 5 MG/ML; UG/ML
INJECTION, SOLUTION EPIDURAL; INFILTRATION; INTRACAUDAL; PERINEURAL AS NEEDED
Status: DISCONTINUED | OUTPATIENT
Start: 2017-07-24 | End: 2017-07-24 | Stop reason: SURG

## 2017-07-24 RX ORDER — EPHEDRINE SULFATE 50 MG/ML
INJECTION, SOLUTION INTRAVENOUS AS NEEDED
Status: DISCONTINUED | OUTPATIENT
Start: 2017-07-24 | End: 2017-07-24 | Stop reason: SURG

## 2017-07-24 RX ORDER — SODIUM CHLORIDE, SODIUM LACTATE, POTASSIUM CHLORIDE, CALCIUM CHLORIDE 600; 310; 30; 20 MG/100ML; MG/100ML; MG/100ML; MG/100ML
INJECTION, SOLUTION INTRAVENOUS CONTINUOUS
Status: DISCONTINUED | OUTPATIENT
Start: 2017-07-24 | End: 2017-07-24

## 2017-07-24 RX ORDER — ONDANSETRON 2 MG/ML
INJECTION INTRAMUSCULAR; INTRAVENOUS AS NEEDED
Status: DISCONTINUED | OUTPATIENT
Start: 2017-07-24 | End: 2017-07-24 | Stop reason: SURG

## 2017-07-24 RX ORDER — ONDANSETRON 2 MG/ML
4 INJECTION INTRAMUSCULAR; INTRAVENOUS EVERY 6 HOURS PRN
Status: DISCONTINUED | OUTPATIENT
Start: 2017-07-24 | End: 2017-07-27

## 2017-07-24 RX ORDER — BISACODYL 10 MG
10 SUPPOSITORY, RECTAL RECTAL
Status: DISCONTINUED | OUTPATIENT
Start: 2017-07-24 | End: 2017-07-27

## 2017-07-24 RX ORDER — NALBUPHINE HCL 10 MG/ML
2.5 AMPUL (ML) INJECTION
Status: DISCONTINUED | OUTPATIENT
Start: 2017-07-24 | End: 2017-07-24

## 2017-07-24 RX ORDER — NALOXONE HYDROCHLORIDE 0.4 MG/ML
0.08 INJECTION, SOLUTION INTRAMUSCULAR; INTRAVENOUS; SUBCUTANEOUS
Status: DISCONTINUED | OUTPATIENT
Start: 2017-07-24 | End: 2017-07-27

## 2017-07-24 RX ORDER — SODIUM PHOSPHATE, DIBASIC AND SODIUM PHOSPHATE, MONOBASIC 7; 19 G/133ML; G/133ML
1 ENEMA RECTAL ONCE AS NEEDED
Status: DISCONTINUED | OUTPATIENT
Start: 2017-07-24 | End: 2017-07-27

## 2017-07-24 RX ORDER — SODIUM CHLORIDE, SODIUM LACTATE, POTASSIUM CHLORIDE, CALCIUM CHLORIDE 600; 310; 30; 20 MG/100ML; MG/100ML; MG/100ML; MG/100ML
INJECTION, SOLUTION INTRAVENOUS
Status: COMPLETED
Start: 2017-07-24 | End: 2017-07-24

## 2017-07-24 RX ORDER — BUPIVACAINE HYDROCHLORIDE 2.5 MG/ML
INJECTION, SOLUTION EPIDURAL; INFILTRATION; INTRACAUDAL
Status: DISPENSED
Start: 2017-07-24 | End: 2017-07-25

## 2017-07-24 RX ORDER — SODIUM CHLORIDE, SODIUM LACTATE, POTASSIUM CHLORIDE, CALCIUM CHLORIDE 600; 310; 30; 20 MG/100ML; MG/100ML; MG/100ML; MG/100ML
INJECTION, SOLUTION INTRAVENOUS CONTINUOUS PRN
Status: DISCONTINUED | OUTPATIENT
Start: 2017-07-24 | End: 2017-07-24 | Stop reason: SURG

## 2017-07-24 RX ORDER — LIDOCAINE HYDROCHLORIDE 10 MG/ML
INJECTION, SOLUTION EPIDURAL; INFILTRATION; INTRACAUDAL; PERINEURAL
Status: DISCONTINUED
Start: 2017-07-24 | End: 2017-07-24 | Stop reason: WASHOUT

## 2017-07-24 RX ORDER — PRENATAL VIT,CAL 76/IRON/FOLIC 29 MG-1 MG
1 TABLET ORAL DAILY
Status: DISCONTINUED | OUTPATIENT
Start: 2017-07-24 | End: 2017-07-27

## 2017-07-24 RX ADMIN — EPHEDRINE SULFATE 5 MG: 50 INJECTION, SOLUTION INTRAVENOUS at 17:41:00

## 2017-07-24 RX ADMIN — ONDANSETRON 4 MG: 2 INJECTION INTRAMUSCULAR; INTRAVENOUS at 17:41:00

## 2017-07-24 RX ADMIN — SODIUM CHLORIDE, SODIUM LACTATE, POTASSIUM CHLORIDE, CALCIUM CHLORIDE: 600; 310; 30; 20 INJECTION, SOLUTION INTRAVENOUS at 17:14:00

## 2017-07-24 RX ADMIN — EPHEDRINE SULFATE 10 MG: 50 INJECTION, SOLUTION INTRAVENOUS at 17:30:00

## 2017-07-24 RX ADMIN — LIDOCAINE HYDROCHLORIDE AND EPINEPHRINE 10 ML: 20; 5 INJECTION, SOLUTION EPIDURAL; INFILTRATION; INTRACAUDAL; PERINEURAL at 17:12:00

## 2017-07-24 RX ADMIN — DEXAMETHASONE SODIUM PHOSPHATE 4 MG: 4 MG/ML VIAL (ML) INJECTION at 17:41:00

## 2017-07-24 RX ADMIN — SODIUM CHLORIDE, SODIUM LACTATE, POTASSIUM CHLORIDE, CALCIUM CHLORIDE: 600; 310; 30; 20 INJECTION, SOLUTION INTRAVENOUS at 17:45:00

## 2017-07-24 RX ADMIN — LIDOCAINE HYDROCHLORIDE AND EPINEPHRINE 5 ML: 20; 5 INJECTION, SOLUTION EPIDURAL; INFILTRATION; INTRACAUDAL; PERINEURAL at 17:14:00

## 2017-07-24 RX ADMIN — LIDOCAINE HYDROCHLORIDE AND EPINEPHRINE 3 ML: 15; 5 INJECTION, SOLUTION EPIDURAL at 15:53:00

## 2017-07-24 RX ADMIN — BUPIVACAINE HYDROCHLORIDE 5 ML: 2.5 INJECTION, SOLUTION EPIDURAL; INFILTRATION; INTRACAUDAL at 15:57:00

## 2017-07-24 NOTE — ANESTHESIA PREPROCEDURE EVALUATION
Anesthesia PreOp Note    HPI:     Luba Escobar is a 36year old female who presents for preoperative consultation requested by: * No surgeons listed *    Date of Surgery: 7/24/2017    * No procedures listed *  Indication: * No pre-op diagnosis entered * strip Rfl: 2 Taking   Acetone, Urine, Test (KETOSTIX) In Vitro Strip Test once daily as directed Disp: 50 strip Rfl: 1 Taking   JUAN MANUEL MICROLET LANCETS Does not apply Misc Use as directed 4 times daily.  Disp: 1 Box Rfl: 2 Taking       Current Facility-Admin Subcutaneous PRN Rosa Mishra MD     citric acid-sodium citrate (BICITRA) solution 30 mL 30 mL Oral PRN Azar Colby MD     Ammonia Aromatic (ammonia) nasal solution 0.3 mL 0.3 mL Nasal PRN Azar Colby MD     ondansetron HCl (ZOFRAN) inj Anesthesia ROS/Med Hx and Physical Exam     Patient summary reviewed and Nursing notes reviewed    Airway   Mallampati: II  TM distance: >3 FB  Neck ROM: full  Dental - normal exam     Pulmonary - negative ROS and normal exam   Cardiovascular - negative RO

## 2017-07-24 NOTE — H&P
[facility]    History & Physical    Saranya Rico Patient Status:  Inpatient    3/9/1975 MRN [id number]   Location [address] Attending Vannesa Mauro MD   Hosp Day # 1 PCP CECELIA Arteaga     Date of Ad GTT 1 Hr        Glucose Fasting        Glucose 1 Hr        Glucose 2 Hr        Glucose 3 Hr        HgB A1c        Vitamin D              8-20 Weeks     Test Value Reference Range Date Time    1st Trimester Aneuploidy Risk Assessment        Quad - Shamika Sickle Cell        24Hr Urine Protein        24Hr Urine Creatinine        Parvo B19 IgM        Parvo B19 IgG                  Complications:  1. Late prenatal care, dated by LMP with an ultrasound that is 2 weeks off but within range  2.   Advanced Does not apply route. 31 gauge, 6 mm, 3/10 cc syringes Disp:  Rfl:  Taking   Blood Glucose Monitoring Suppl (JUAN MANUEL CONTOUR NEXT EZ) w/Device Does not apply Kit 1 Device by Other route 4 (four) times daily. Use as directed.  Disp: 1 kit Rfl: 0 Taking   Gluco Period consistent with 12 week ultrasound admitted for induction of labor due to gestational diabetes A2, advanced maternal age, polyhydramnios all at term. Fetal heart tones reassuring overall. Vertex presentation on bedside ultrasound.   Patient with Ce

## 2017-07-24 NOTE — OPERATIVE REPORT
[facility]     Section Delivery Note    Celia Mcguire Patient Status:  Inpatient    3/9/1975 MRN [id number]   Location [address] Attending Jose Dumont MD   Hosp Day # 1 PCP CECELIA Clayton

## 2017-07-24 NOTE — ANESTHESIA PROCEDURE NOTES
Epidural Block  Performed by: Anna Ham  Authorized by: Anna Ham     Patient Location:  OB  Start Time:  7/24/2017 3:44 PM  End Time:  7/24/2017 3:56 PM  Reason for Block: labor epidural    Anesthesiologist:  Anna Ham  Performed by:   Anesthes

## 2017-07-25 LAB
BASOPHILS # BLD: 0 K/UL (ref 0–0.2)
BASOPHILS NFR BLD: 0 %
EOSINOPHIL # BLD: 0 K/UL (ref 0–0.7)
EOSINOPHIL NFR BLD: 0 %
ERYTHROCYTE [DISTWIDTH] IN BLOOD BY AUTOMATED COUNT: 15 % (ref 11–15)
GLUCOSE BLDC GLUCOMTR-MCNC: 106 MG/DL (ref 70–99)
GLUCOSE BLDC GLUCOMTR-MCNC: 114 MG/DL (ref 70–99)
GLUCOSE BLDC GLUCOMTR-MCNC: 119 MG/DL (ref 70–99)
GLUCOSE BLDC GLUCOMTR-MCNC: 95 MG/DL (ref 70–99)
HCT VFR BLD AUTO: 27.4 % (ref 35–48)
HGB BLD-MCNC: 9 G/DL (ref 12–16)
LYMPHOCYTES # BLD: 1.4 K/UL (ref 1–4)
LYMPHOCYTES NFR BLD: 10 %
MCH RBC QN AUTO: 26.5 PG (ref 27–32)
MCHC RBC AUTO-ENTMCNC: 32.9 G/DL (ref 32–37)
MCV RBC AUTO: 80.5 FL (ref 80–100)
MONOCYTES # BLD: 1.2 K/UL (ref 0–1)
MONOCYTES NFR BLD: 9 %
NEUTROPHILS # BLD AUTO: 11.2 K/UL (ref 1.8–7.7)
NEUTROPHILS NFR BLD: 81 %
PLATELET # BLD AUTO: 173 K/UL (ref 140–400)
PMV BLD AUTO: 9 FL (ref 7.4–10.3)
RBC # BLD AUTO: 3.41 M/UL (ref 3.7–5.4)
WBC # BLD AUTO: 13.9 K/UL (ref 4–11)

## 2017-07-25 RX ORDER — HYDROCODONE BITARTRATE AND ACETAMINOPHEN 7.5; 325 MG/1; MG/1
1 TABLET ORAL EVERY 6 HOURS PRN
Status: DISCONTINUED | OUTPATIENT
Start: 2017-07-25 | End: 2017-07-27

## 2017-07-25 RX ORDER — SODIUM CHLORIDE, SODIUM LACTATE, POTASSIUM CHLORIDE, CALCIUM CHLORIDE 600; 310; 30; 20 MG/100ML; MG/100ML; MG/100ML; MG/100ML
INJECTION, SOLUTION INTRAVENOUS ONCE
Status: DISCONTINUED | OUTPATIENT
Start: 2017-07-25 | End: 2017-07-27

## 2017-07-25 RX ORDER — DOCUSATE SODIUM 100 MG/1
100 CAPSULE, LIQUID FILLED ORAL
Status: DISCONTINUED | OUTPATIENT
Start: 2017-07-25 | End: 2017-07-27

## 2017-07-25 RX ORDER — IBUPROFEN 600 MG/1
600 TABLET ORAL EVERY 6 HOURS PRN
Status: DISCONTINUED | OUTPATIENT
Start: 2017-07-25 | End: 2017-07-27

## 2017-07-25 RX ORDER — SODIUM CHLORIDE, SODIUM LACTATE, POTASSIUM CHLORIDE, CALCIUM CHLORIDE 600; 310; 30; 20 MG/100ML; MG/100ML; MG/100ML; MG/100ML
INJECTION, SOLUTION INTRAVENOUS
Status: DISPENSED
Start: 2017-07-25 | End: 2017-07-25

## 2017-07-25 NOTE — PROGRESS NOTES
BP taken low 88/49 88/50 pulse 72 . Denies SOB Dr. Cherylene Halon notified with order for bolus IV.

## 2017-07-25 NOTE — PLAN OF CARE
BREAST FEEDING    • Optimize infant feeding at the breast Progressing        GENITOURINARY - ADULT    • Absence of urinary retention Progressing        INADEQUATE LATCH, SUCK OR SWALLOW    • Demonstrate ability to latch and sustain latch, audible swallowin

## 2017-07-25 NOTE — OPERATIVE REPORT
[facility]    PATIENT'S NAME: [facility]   ATTENDING PHYSICIAN: Ayush Diaz MD   OPERATING PHYSICIAN: Sharri Arizmendi MD   PATIENT ACCOUNT#:   [id number]    LOCATION:  [address] [place] [address] Drive #:   [id number]       DATE OF BIRTH good working order by Anesthesia. Upon presentation to the OR, Betadine prep was applied to the abdomen and the Redmond catheter was placed immediately in the bladder.   In addition, sequential compression devices were placed immediately upon presentation to subcutaneous tissue was irrigated. Good hemostasis noted. The skin was closed with skin staples. A pressure dressing was applied. Ancef 2 g was given by Anesthesia at the beginning of the case.     The patient tolerated the procedure well and was taken

## 2017-07-25 NOTE — LACTATION NOTE
LACTATION NOTE - MOTHER           Problems identified  Problems identified: Knowledge deficit;Milk supply not WNL  Milk supply not WNL: Reduced (potential)  Problems Identified Other: supplemented w/formula per choice    Maternal history  Maternal history:

## 2017-07-25 NOTE — PLAN OF CARE
GENITOURINARY - ADULT    • Absence of urinary retention Progressing        Patient Centered Care    • Patient preferences are identified and integrated in the patient's plan of care Progressing        POSTPARTUM    • Long Term Goal:Experiences normal postp

## 2017-07-25 NOTE — PLAN OF CARE
Problem: POSTPARTUM  Goal: Optimize infant feeding at the breast  INTERVENTIONS:  - Initiate breast feeding within first hour after birth. - Monitor effectiveness of current breast feeding efforts. - Assess support systems available to mother/family.   - systems available to mother/family.  - Identify cultural beliefs/practices regarding lactation, letdown techniques, maternal food preferences.   - Assess mother's knowledge and previous experience with breast feeding.  - Provide information as needed about

## 2017-07-25 NOTE — PROGRESS NOTES
POD 1  Pt doing well no c/o    Alert and oriented, nad  abd soft, nt, ff, dressing c/d/i. Ext nt    Hb 9, preop 11    A/p POD 1  Pt doing well, no c/o. Ambulate.

## 2017-07-25 NOTE — PLAN OF CARE
Mom and baby admitted into room 370, report received from L&D Rn Katelyn MORTON ID bands were verified.

## 2017-07-26 LAB
BACTERIA UR QL AUTO: NEGATIVE /HPF
BILIRUB UR QL: NEGATIVE
CLARITY UR: CLEAR
COLOR UR: YELLOW
GLUCOSE BLDC GLUCOMTR-MCNC: 71 MG/DL (ref 70–99)
GLUCOSE UR-MCNC: NEGATIVE MG/DL
HYALINE CASTS #/AREA URNS AUTO: 1 /LPF
KETONES UR-MCNC: NEGATIVE MG/DL
LEUKOCYTE ESTERASE UR QL STRIP.AUTO: NEGATIVE
NITRITE UR QL STRIP.AUTO: NEGATIVE
PH UR: 6 [PH] (ref 5–8)
PROT UR-MCNC: NEGATIVE MG/DL
RBC #/AREA URNS AUTO: 10 /HPF
SP GR UR STRIP: 1.01 (ref 1–1.03)
UROBILINOGEN UR STRIP-ACNC: <2
VIT C UR-MCNC: NEGATIVE MG/DL
WBC #/AREA URNS AUTO: 2 /HPF

## 2017-07-26 NOTE — PROGRESS NOTES
[facility] - [facility]    Post-Partum Caesarean Section Progress Note    Juliocesar Briscoe Patient Status:  Inpatient    3/9/1975 MRN [id number]   Location [facility] 3SE Attending Arabella Baker MD   Hosp Day # 3 PCP CECELIA Webb [place] postpartum    We will plan on removing Redmond and repeating voiding trial this afternoon. Patient otherwise to continue with routine postoperative care. Encourage ambulation.   We will stop doing Accu-Cheks at this point since they are normal.    Nasra Meter

## 2017-07-26 NOTE — LACTATION NOTE
LACTATION NOTE - MOTHER           Problems identified  Problems identified: Knowledge deficit  Milk supply not WNL: Reduced (potential)  Problems Identified Other: supplemented w/formula per choice    Maternal history  Maternal history:  section; Ge

## 2017-07-27 ENCOUNTER — TELEPHONE (OUTPATIENT)
Dept: OBGYN CLINIC | Facility: CLINIC | Age: 42
End: 2017-07-27

## 2017-07-27 VITALS
HEART RATE: 75 BPM | DIASTOLIC BLOOD PRESSURE: 65 MMHG | RESPIRATION RATE: 18 BRPM | SYSTOLIC BLOOD PRESSURE: 123 MMHG | OXYGEN SATURATION: 98 % | TEMPERATURE: 98 F

## 2017-07-27 RX ORDER — HYDROCODONE BITARTRATE AND ACETAMINOPHEN 7.5; 325 MG/1; MG/1
1 TABLET ORAL EVERY 6 HOURS PRN
Qty: 30 TABLET | Refills: 0 | Status: SHIPPED | OUTPATIENT
Start: 2017-07-27

## 2017-07-27 RX ORDER — PSEUDOEPHEDRINE HCL 30 MG
100 TABLET ORAL 2 TIMES DAILY
Qty: 30 CAPSULE | Refills: 0 | Status: SHIPPED | OUTPATIENT
Start: 2017-07-27

## 2017-07-27 RX ORDER — IBUPROFEN 600 MG/1
600 TABLET ORAL EVERY 6 HOURS PRN
Qty: 30 TABLET | Refills: 0 | Status: SHIPPED | OUTPATIENT
Start: 2017-07-27

## 2017-07-27 RX ORDER — FERROUS SULFATE 325(65) MG
325 TABLET ORAL
Qty: 30 TABLET | Refills: 0 | Status: SHIPPED | OUTPATIENT
Start: 2017-07-27

## 2017-07-27 NOTE — PROGRESS NOTES
Discharge instructions given on post c/s care, excessive vaginal bleed, fever, and uncontrolled pain; patient verbalized understanding. Follow-up needed by Monday for staple removal. Prescription given for pain med and PNV.  Discharged home via w/c with iris

## 2017-07-27 NOTE — PLAN OF CARE
BREAST FEEDING    • Optimize infant feeding at the breast Completed        GENITOURINARY - ADULT    • Absence of urinary retention Completed        INADEQUATE LATCH, SUCK OR SWALLOW    • Demonstrate ability to latch and sustain latch, audible swallowing an

## 2017-07-27 NOTE — TELEPHONE ENCOUNTER
Needs a staple removal for this Saturday no openings besides a nurse education slot can we use that one or have  aslot added on for pt? Please call pt @ [phone] [facility] if no answer.

## 2017-07-27 NOTE — DISCHARGE SUMMARY
[facility]    Post-Partum Caesarean Section Discharge Summary/Note    Franklin Burk Patient Status:  Inpatient    3/9/1975 MRN [id number]   Location [facility] 3SE Attending Dimitri Haywood MD   Hosp Day # 4 PCP Lucero [place], ASSESSMENT/PLAN:    A: 36 y  status post  section, POD #3. Doing well postoperatively. Patient is breast-feeding and supplementing with bottle.   Patient had gestational diabetes A2 on evening NPH and her postdelivery Accu-Cheks have bee Surekha Morales    Baby:  Infant Sex:    Information for the patient's : Mary Sheffield, Girl  [[id number]]     female  Infant Birthweight:   Information for the patient's : Mary Sheffield, Girl  [[id number]]     8 lb 6.6 oz (3.815 kg Information     Call [facility] Lactation Services. Why:  As needed  Contact information:  [address]   [facility] [id number] Sandrita Ortiz MD. Schedule an appointment as soon as possible for a visit in

## 2017-07-29 ENCOUNTER — NURSE ONLY (OUTPATIENT)
Dept: OBGYN CLINIC | Facility: CLINIC | Age: 42
End: 2017-07-29

## 2017-07-29 VITALS — TEMPERATURE: 98 F

## 2017-07-29 DIAGNOSIS — Z48.02: Primary | ICD-10-CM

## 2017-07-29 NOTE — PROGRESS NOTES
Pt here for staple removal. Incision well appoximated and intact, no redness, discharge or swelling. Staples removed, cleaned with normal saline and hydrogen peroxide and steri strips applied. Pt tolerated procedure well.  Pt informed of infection precautio

## 2017-09-01 ENCOUNTER — POSTPARTUM (OUTPATIENT)
Dept: OBGYN CLINIC | Facility: CLINIC | Age: 42
End: 2017-09-01

## 2017-09-01 VITALS
DIASTOLIC BLOOD PRESSURE: 78 MMHG | WEIGHT: 204 LBS | HEART RATE: 56 BPM | BODY MASS INDEX: 35 KG/M2 | SYSTOLIC BLOOD PRESSURE: 120 MMHG

## 2017-09-01 DIAGNOSIS — Z30.09 ENCOUNTER FOR OTHER GENERAL COUNSELING OR ADVICE ON CONTRACEPTION: Primary | ICD-10-CM

## 2017-09-01 PROCEDURE — 99213 OFFICE O/P EST LOW 20 MIN: CPT | Performed by: OBSTETRICS & GYNECOLOGY

## 2017-09-01 NOTE — PROGRESS NOTES
HPI:   Nadege Cuadra is a 36year old female who presents for a pp visit and contraception consult. Pt counseled on options of contraception, pt stated she was on depo provera, considering this but still undecided. Will call with decision.       Wt Readings Alcohol use:  No                     REVIEW OF SYSTEMS:   GENERAL: feels well otherwise  SKIN: denies any unusual skin lesions  EYES:denies blurred vision or double vision  HEENT: denies nasal congestion, sinus pain or ST  LUNGS: denies shortness of breat

## (undated) DEVICE — SUTURE VICRYL 2-0 CT-1

## (undated) DEVICE — STERILE POLYISOPRENE POWDER-FREE SURGICAL GLOVES: Brand: PROTEXIS

## (undated) DEVICE — COVER SGL STRL LGHT HNDL BLU

## (undated) DEVICE — ABDOMINAL PAD: Brand: CURITY

## (undated) DEVICE — NON-ADHERENT PAD PREPACK: Brand: TELFA

## (undated) DEVICE — VIOLET BRAIDED (POLYGLACTIN 910), SYNTHETIC ABSORBABLE SUTURE: Brand: COATED VICRYL

## (undated) DEVICE — SPONGE: LAP 18X18 PW 200/CS: Brand: NOVAPLUS®

## (undated) DEVICE — 3M™ STERI-STRIP™ REINFORCED ADHESIVE SKIN CLOSURES, R1547, 1/2 IN X 4 IN (12 MM X 100 MM), 6 STRIPS/ENVELOPE: Brand: 3M™ STERI-STRIP™

## (undated) DEVICE — REM POLYHESIVE ADULT PATIENT RETURN ELECTRODE: Brand: VALLEYLAB

## (undated) DEVICE — KENDALL SCD EXPRESS SLEEVES, KNEE LENGTH, MEDIUM: Brand: KENDALL SCD

## (undated) DEVICE — C SECTION PACK: Brand: MEDLINE INDUSTRIES, INC.

## (undated) DEVICE — SUTURE VICRYL 0 J340H

## (undated) DEVICE — TRAY CATH BDX IC 14FR 2L FL

## (undated) DEVICE — STERILE LATEX POWDER-FREE SURGICAL GLOVESWITH NITRILE COATING: Brand: PROTEXIS

## (undated) NOTE — Clinical Note
5/3/2017              Brayan [address] [place] [address],[address]         Dear Tiffanie Pagan,    It was a pleasure to see you. Your Gonorrhea/Chlamydia screen was negative. There is no need for further testing at this time.   I look forward t

## (undated) NOTE — MR AVS SNAPSHOT
[facility]  [address] [place] [address] [place] [id number] [phone]               Thank you for choosing us for your health care visit with Che Campos MD.  We are glad to serve you and happy to provide you with this summary of your visit. 127/79 mmHg 109 219 lb 6.4 oz (99.519 kg)            Current Medications          This list is accurate as of: 6/15/17  9:25 AM.  Always use your most recent med list.                Acetone (Urine) Test Strp   Test once daily as directed   Commonly kn

## (undated) NOTE — MR AVS SNAPSHOT
[facility]  [address] [place] [id number] [phone]               Thank you for choosing us for your health care visit with Kassandra Dewitt.  Shanna Aguero MD.  We are glad to serve you and happy to provide you with this summary of your visit 113/74 mmHg 79 219 lb (99.338 kg)            Current Medications          This list is accurate as of: 6/1/17  9:59 AM.  Always use your most recent med list.                Acetone (Urine) Test Strp   Test once daily as directed   Commonly known as:  KET

## (undated) NOTE — IP AVS SNAPSHOT
[address]  [address] [facility] [phone]                Discharge Summary   5/19/2017    Mandi Suazo           Admission Information        Provider Department    5/19/2017 Lavelle Rodríguez MD [facility] Mater US [address] AND DTL FTL ANT SLG(CPT=76811)  5/19/2017 (Approximate) 5/19/2018    Scheduling Instructions:     To schedule a test at any Critical access hospital, [employer] Scheduling at [phone], Monday through Friday between 7: Radiology Exams     None         Additional Information       We are concerned for your overall well being:    - If you are a smoker or have smoked in the last 12 months, we encourage you to explore options for quitting.     - If you have concerns related

## (undated) NOTE — Clinical Note
Twice weekly  testing: weekly NST and weekly BPP.  Delivery at 38 weeks for insulin requiring diabetes.  I recommended that she continue her NPH at 6 units at night  We discussed the importance of fetal movement monitoring and  testing She

## (undated) NOTE — Clinical Note
Follow-up Growth ultrasound in 4 weeks. Weekly NST's and increase to twice weekly at 36 weeks should change to weekly NST and weekly BPP.  Delivery at 38 weeks for insulin requiring diabetes.  I recommended that she increase her NPH to 6 units at night (inc

## (undated) NOTE — Clinical Note
Follow-up Growth ultrasound in 4 weeks. Weekly NST's at 34 weeks \\ Twice weekly testing at 38 weeks - weekly NST and weekly BPP. Delivery at 39 weeks.   Since we can not get accurate dating this late and ultrasound differs by 2 weeks ([facility] 7-24-17), I wo

## (undated) NOTE — IP AVS SNAPSHOT
Patient Demographics     Address Phone E-mail Address    [address] [facility] [address] [place] [phone] [facility])  [phone] (Mobile) [email or web address]      Emergency Contact(s)     Name Relation Home Work Mobile    TanyaToby Life Partner [phone] 6/20/2017 1:30 PM [address] [facility],First Floor Maternal Fetal Medicine  [address]

## (undated) NOTE — IP AVS SNAPSHOT
[facility]            (For Outpatient Use Only) Initial Admit Date: 7/20/2017   Inpt/Obs Admit Date: Inpt: N/A / Obs: N/A   Discharge Date:    Hospital Acct:  [de-identified]   MRN: [de-identified]   CSN: [id number]        ENCOUNTER  Patient Class: OU

## (undated) NOTE — Clinical Note
Twice weekly  testing: weekly NST and weekly BPP.  Delivery at 38 weeks for insulin requiring diabetes.

## (undated) NOTE — MR AVS SNAPSHOT
[facility]  [address] [place] [address] [phone]               Thank you for choosing us for your health care visit with Nurse. We are glad to serve you and happy to provide you with this summary of your visit.   Please help u

## (undated) NOTE — IP AVS SNAPSHOT
[facility]            (For Outpatient Use Only) Initial Admit Date: 7/12/2017   Inpt/Obs Admit Date: Inpt: N/A / Obs: N/A   Discharge Date:    Hospital Acct:  [de-identified]   MRN: [de-identified]   CSN: [id number]        ENCOUNTER  Patient Class: OU

## (undated) NOTE — MR AVS SNAPSHOT
[facility]  [address] [place] [address] [phone]               Thank you for choosing us for your health care visit with CECELIA Ferro. We are glad to serve you and happy to provide you with this summary of your visit. Urine Culture, Routine    Complete by: May 01, 2017 (Approximate)    Assoc Dx:  Encounter for supervision of other normal pregnancy in second trimester [Z34.82]           HIV AG AB Combo    Complete by:   May 01, 2017 (Approximate)    Assoc Dx:  Jenn Vega [address]   Phone:  [phone]       Comment:  Estimated Date of Delivery: None noted. [address] [facility]    [address] [facility]    581-4 Encounter for supervision of other normal pregnancy in second trimester        Late prenatal care complicating pregnancy, second trimester          Instructions and Information about Your Health     None      Allergies as of May 01, 2017     No Known [place] Start activities slowly and build up over time Do what you like   Get your heart pumping – brisk walking, biking, swimming Even 10 minute increments are effective and add up over the week   2 ½ hours per week – spread out over time Use a melba to keep you

## (undated) NOTE — IP AVS SNAPSHOT
Patient Demographics     Address Phone E-mail Address    [address] [place] [address] [phone] [facility])  [phone] (Mobile) [email or web address]      Emergency Contact(s)     Name Relation Home Work Mobile    TanyaToby rajput Life Partner [phone] Recent Vital Signs       Most Recent Value    Vitals 114/66 mmHg Filed at 06/20/2017 1336    Pulse 72 Filed at 06/20/2017 1336    Resp     Temp     SpO2       Lab Results Last 24 Hours     No matching results found      Microbiology Results (All)     None

## (undated) NOTE — IP AVS SNAPSHOT
Patient Demographics     Address  [address] [place] [address]  [phone] Phone  [phone] [facility])  [phone] [facility] E-mail Address  [email or web address]      Emergency Contact(s)     Name Relation Home Work Mobile    Toby Martínez Partner [phone] ** No medications to display **            Recent Vital Signs    Flowsheet Row Most Recent Value   Vitals  104/77 Filed at 07/20/2017 0958   Pulse  101 Filed at 07/20/2017 0958   Resp  No data   Temp  No data   SpO2  No data      Lab Results Last 24 Hours

## (undated) NOTE — IP AVS SNAPSHOT
Patient Demographics     Address  [address] [place] [address]  [address] Phone  [phone] [facility])  [phone] [facility] E-mail Address  [email or web address]      Emergency Contact(s)     Name Relation Home Work Mobile    Toby Martínez Partner [phone] 7/17/2017 5:30  [facility] NST TRIAGE RM [address] [facility]    7/19/2017 5:30  [facility]    7/20/2017 10:00 AM Jossy Ibanez 484 Maternal Fetal Med

## (undated) NOTE — IP AVS SNAPSHOT
[address]  [address] [facility] [phone]                Discharge Summary   6/20/2017    [facility]           Admission Information        Provider Department    6/20/2017 Laith Carrington MD [facility] Maternal Krys Yap     [    ]    [    ]    [    ]    [    ]       Insulin NPH (Human) (Isophane) 100 UNIT/ML Susp   Commonly known as:  HUMULIN N,NOVOLIN N        Inject 3 Units into the skin every evening.     Jay Jay Art     [    ]    [    ]    [    ] [place]    It is the patient's responsibility to check with and follow their insurance company's guidelines for prior authorization for this test.  You may be held responsible for payment in full if proper authorization is not acquired.   Please contac and ask to get set up for an insurance coverage that is in-network with [facility].             _____________________________________________________________________________    Medication Side Effects - Medications to be taken at home  As your